# Patient Record
Sex: FEMALE | URBAN - METROPOLITAN AREA
[De-identification: names, ages, dates, MRNs, and addresses within clinical notes are randomized per-mention and may not be internally consistent; named-entity substitution may affect disease eponyms.]

---

## 2018-05-18 ENCOUNTER — OFFICE VISIT (OUTPATIENT)
Dept: ENDOCRINOLOGY | Facility: CLINIC | Age: 73
End: 2018-05-18
Payer: COMMERCIAL

## 2018-05-18 VITALS
OXYGEN SATURATION: 97 % | DIASTOLIC BLOOD PRESSURE: 78 MMHG | HEIGHT: 66 IN | BODY MASS INDEX: 37.46 KG/M2 | WEIGHT: 233.1 LBS | SYSTOLIC BLOOD PRESSURE: 147 MMHG | HEART RATE: 91 BPM

## 2018-05-18 DIAGNOSIS — E06.3 HASHIMOTO'S THYROIDITIS: ICD-10-CM

## 2018-05-18 DIAGNOSIS — K90.2 BLIND LOOP SYNDROME: Primary | ICD-10-CM

## 2018-05-18 DIAGNOSIS — R53.82 CHRONIC FATIGUE: ICD-10-CM

## 2018-05-18 DIAGNOSIS — E03.9 HYPOTHYROIDISM, UNSPECIFIED TYPE: ICD-10-CM

## 2018-05-18 LAB
ANION GAP SERPL CALCULATED.3IONS-SCNC: 7 MMOL/L (ref 3–14)
BUN SERPL-MCNC: 14 MG/DL (ref 7–30)
CALCIUM SERPL-MCNC: 9.4 MG/DL (ref 8.5–10.1)
CHLORIDE SERPL-SCNC: 109 MMOL/L (ref 94–109)
CO2 SERPL-SCNC: 26 MMOL/L (ref 20–32)
CREAT SERPL-MCNC: 0.65 MG/DL (ref 0.52–1.04)
DEPRECATED CALCIDIOL+CALCIFEROL SERPL-MC: 48 UG/L (ref 20–75)
GFR SERPL CREATININE-BSD FRML MDRD: 89 ML/MIN/1.7M2
GLUCOSE SERPL-MCNC: 118 MG/DL (ref 70–99)
POTASSIUM SERPL-SCNC: 4.2 MMOL/L (ref 3.4–5.3)
SODIUM SERPL-SCNC: 142 MMOL/L (ref 133–144)
T3 SERPL-MCNC: 145 NG/DL (ref 60–181)
T4 FREE SERPL-MCNC: 0.6 NG/DL (ref 0.76–1.46)
TSH SERPL DL<=0.005 MIU/L-ACNC: 0.31 MU/L (ref 0.4–4)
VIT B12 SERPL-MCNC: 709 PG/ML (ref 193–986)

## 2018-05-18 PROCEDURE — 84480 ASSAY TRIIODOTHYRONINE (T3): CPT | Performed by: INTERNAL MEDICINE

## 2018-05-18 PROCEDURE — 99000 SPECIMEN HANDLING OFFICE-LAB: CPT | Performed by: INTERNAL MEDICINE

## 2018-05-18 PROCEDURE — 82607 VITAMIN B-12: CPT | Performed by: INTERNAL MEDICINE

## 2018-05-18 PROCEDURE — 82306 VITAMIN D 25 HYDROXY: CPT | Performed by: INTERNAL MEDICINE

## 2018-05-18 PROCEDURE — 36415 COLL VENOUS BLD VENIPUNCTURE: CPT | Performed by: INTERNAL MEDICINE

## 2018-05-18 PROCEDURE — 99204 OFFICE O/P NEW MOD 45 MIN: CPT | Performed by: INTERNAL MEDICINE

## 2018-05-18 PROCEDURE — 84443 ASSAY THYROID STIM HORMONE: CPT | Performed by: INTERNAL MEDICINE

## 2018-05-18 PROCEDURE — 84439 ASSAY OF FREE THYROXINE: CPT | Performed by: INTERNAL MEDICINE

## 2018-05-18 PROCEDURE — 84207 ASSAY OF VITAMIN B-6: CPT | Mod: 90 | Performed by: INTERNAL MEDICINE

## 2018-05-18 PROCEDURE — 80048 BASIC METABOLIC PNL TOTAL CA: CPT | Performed by: INTERNAL MEDICINE

## 2018-05-18 RX ORDER — IRON 18 MG
29 TABLET ORAL
COMMUNITY
Start: 2016-02-09

## 2018-05-18 RX ORDER — CALCIUM POLYCARBOPHIL 625 MG
TABLET ORAL
COMMUNITY
Start: 2016-02-09

## 2018-05-18 RX ORDER — THYROID, PORCINE 60 MG/1
65 TABLET ORAL
COMMUNITY
Start: 2018-01-10

## 2018-05-18 RX ORDER — VALACYCLOVIR HYDROCHLORIDE 1 G/1
TABLET, FILM COATED ORAL
COMMUNITY
Start: 2015-04-02

## 2018-05-18 RX ORDER — MAGNESIUM HYDROXIDE 400 MG/5ML
SUSPENSION, ORAL (FINAL DOSE FORM) ORAL
COMMUNITY
Start: 2016-02-09

## 2018-05-18 RX ORDER — MULTIVITAMIN,THER AND MINERALS
TABLET ORAL
COMMUNITY
Start: 2016-02-09

## 2018-05-18 ASSESSMENT — PAIN SCALES - GENERAL: PAINLEVEL: NO PAIN (0)

## 2018-05-18 NOTE — PROGRESS NOTES
- Endocrinology Initial Consultation -    Reason for visit/consult:  hypothyroidism    Primary care provider: Ilan Celestin    HPI: A 72 yo female here for the second opinion for her hypothyroidism and treatment. She came with her  today.   Medical record from primary care physician and also from [facility] were reviewed prior to the session.   Her hypothyroidism was recently diagnosed in 2014 with a symptom of brain fogginess and chronic fatigue.  She was started on levothyroxine, she tried 3 years and last dose was levothyroxine 88 mcg and 100 mcg alternate days.   She still did not feel well, her TSH was 2.87,  free T4 0.91 in July 2017.   Her daughter has also hypothyroidism and she was started nature thyroid felt better.  Patient wanted to try nature thyroid and asked PMD.  She was started on 65 mcg and currently taking 130 mcg daily.  She was recommended to go to endocrinologist, she met through endocrinologist in [facility], however nature thyroid was not recommended, therefore she came today to look for an endocrinologist who can manage nature thyroid.  Since started to nature thyroid, her TSH has been lower side to suppressed, range from 0.4 to 0.05.  Her free T4 has been also suppressed range from 0.76 to 0.57.     Patient preferred nature thyroid, which worked for her brain fogginess and intolerance.  Her hair loss also getting better and her energy level getting better. Body weight stable.     Of note, patient has history of  IBS, as well as small bowel surgery twice. 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 She also has sleep apnea for 13 years on CPAP.     Energy level: better  Dry skin: not much  Hair loss: better  Weight changes: stable   BM:occasional   Concentration:   Forgetfulness:improved.   Family history of thyroid disease: daughter thyroid issues,  "nephew+      Past Medical/Surgical History:  Past Medical History:   Diagnosis Date     Sleep apnea      No past surgical history on file.    Allergies:  Allergies   Allergen Reactions     Nsaids      Other reaction(s): Other, see comments  PN: Ulcer     Celecoxib Rash       Current Medications   Current Outpatient Prescriptions   Medication     ascorbic acid 1000 MG TABS tablet     Calcium Polycarbophil (FIBER) 625 MG tablet     Cholecalciferol (VITAMIN D3) 1000 units CAPS     coenzyme Q-10 10 MG CAPS     Ferrous Sulfate (IRON) 90 (18 Fe) MG TABS     Glucosamine-Chondroit-Vit C-Mn (GLUCOSAMINE CHONDROITINOITIN COMPLEX) CAPS     ranitidine (ZANTAC) 150 MG tablet     Selenium 200 MCG TABS     Thyroid 65 MG TABS     valACYclovir (VALTREX) 1000 mg tablet     vitamin  s/Minerals TABS     No current facility-administered medications for this visit.        Family History:  No family history on file.    Social History:  Social History   Substance Use Topics     Smoking status: Not on file     Smokeless tobacco: Not on file     Alcohol use Not on file   Job: RN at [employer].     ROS:  Full review of systems taken with the help of the intake sheet. Otherwise a complete 14 point review of systems was taken and is negative unless stated in the history above.        Physical Exam:   Blood pressure 147/78, pulse 91, height 1.672 m (5' 5.83\"), weight 105.7 kg (233 lb 1.6 oz), SpO2 97 %.    General: well appearing, no acute distress, pleasant and conversant,   Mental Status/neuro: alert and oriented  Face: symmetrical, normal facial color  Eyes: anicteric, PERRL, no proptosis or lid lag  Neck: suppler, no lymphadenopahty  Thyroid: normal size and texture, no nodule palpable, no bruits  Heart: regular rhythm, S1S2, no murmur appreciated  Lung: clear to auscultation bilaterally  Abdomen: soft, NT/ND, no hepatomegaly  Legs: no swelling or edema        Labs : I reviewed data from outside records and extract and summarize the pertinent data " here.                    3/13/2018    1/10/2018   11/2017   9/2017   7/2017  TSH            0.445           0.05           0.1555     1.67      2.87  Free T4      0.57             0.8            0.76           0.62      0.91                                                                                         (prior to Nature thyroid)     (11/2017),  TSI 46 (0-139) (11/2017)    Assessment and Plan  73 year old female with hypothyrodism, history of small intestine surgery twice,    # Hypothyroidism  Both suppressed TSH and free T4 reflects dosing of T3 supplement, which derives from Nature thyroid. We went over the standard thryoid managemenet and importance of storage form of thyroid hormone as well.   Since she has history of small bowel resections twice, I think she may have some absorption issues, therefore recommend Tirosint such as 100 mcg. She wants to continue nature thyroid so far and she will think about my recommendation.   Meanwhile, we will measure TSH, free T4, total T3 today  - TSH, free T4, total T3 today    # Small bowel resections twice  She may have absorption issues, will check Vitmain D, Vitamin B6, and Vitamin B12, Ca  level today. Per patient recent CBC was OK.     # Bone health  Due to small bowel resections, recommend to check bone density and meanwhile start Calcium citrate plus D (elemental Ca 630 mg, Vitamin D 500 IU) for bone health and prevention.   - Bone density at PMD office to recommend    - RTC with me if necessary      I spent 45 minutes with this patient face to face and explained the conditions and plans (more than 50% of time was counseling/coordination of care) . The patient understood and is satisfied with today's visit. Return to clinic with me in PRN.         Melba Turpin MD  Staff Physician  Endocrinology and Metabolism  License: [id number]

## 2018-05-18 NOTE — LETTER
5/18/2018         RE: Grabiel Ballesteros  [address] [place] [address]        Dear Colleague,    Thank you for referring your patient, Grabiel Ballesteros, to the [facility]. Please see a copy of my visit note below.                                                                               - Endocrinology Initial Consultation -    Reason for visit/consult:  hypothyroidism    Primary care provider: Ilan Celestin    HPI: A 72 yo female here for the second opinion for her hypothyroidism and treatment. She came with her  today.   Medical record from primary care physician and also from [facility] were reviewed prior to the session.   Her hypothyroidism was recently diagnosed in 2014 with a symptom of brain fogginess and chronic fatigue.  She was started on levothyroxine, she tried 3 years and last dose was levothyroxine 88 mcg and 100 mcg alternate days.   She still did not feel well, her TSH was 2.87,  free T4 0.91 in July 2017.   Her daughter has also hypothyroidism and she was started nature thyroid felt better.  Patient wanted to try nature thyroid and asked PMD.  She was started on 65 mcg and currently taking 130 mcg daily.  She was recommended to go to endocrinologist, she met through endocrinologist in [facility], however nature thyroid was not recommended, therefore she came today to look for an endocrinologist who can manage nature thyroid.  Since started to nature thyroid, her TSH has been lower side to suppressed, range from 0.4 to 0.05.  Her free T4 has been also suppressed range from 0.76 to 0.57.     Patient preferred nature thyroid, which worked for her brain fogginess and intolerance.  Her hair loss also getting better and her energy level getting better. Body weight stable.     Of note, patient has history of  IBS, as well as small bowel surgery twice. 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 She also  "has sleep apnea for 13 years on CPAP.     Energy level: better  Dry skin: not much  Hair loss: better  Weight changes: stable   BM:occasional   Concentration:   Forgetfulness:improved.   Family history of thyroid disease: daughter thyroid issues, nephew+      Past Medical/Surgical History:  Past Medical History:   Diagnosis Date     Sleep apnea      No past surgical history on file.    Allergies:  Allergies   Allergen Reactions     Nsaids      Other reaction(s): Other, see comments  PN: Ulcer     Celecoxib Rash       Current Medications   Current Outpatient Prescriptions   Medication     ascorbic acid 1000 MG TABS tablet     Calcium Polycarbophil (FIBER) 625 MG tablet     Cholecalciferol (VITAMIN D3) 1000 units CAPS     coenzyme Q-10 10 MG CAPS     Ferrous Sulfate (IRON) 90 (18 Fe) MG TABS     Glucosamine-Chondroit-Vit C-Mn (GLUCOSAMINE CHONDROITINOITIN COMPLEX) CAPS     ranitidine (ZANTAC) 150 MG tablet     Selenium 200 MCG TABS     Thyroid 65 MG TABS     valACYclovir (VALTREX) 1000 mg tablet     vitamin  s/Minerals TABS     No current facility-administered medications for this visit.        Family History:  No family history on file.    Social History:  Social History   Substance Use Topics     Smoking status: Not on file     Smokeless tobacco: Not on file     Alcohol use Not on file   Job: RN at [employer].     ROS:  Full review of systems taken with the help of the intake sheet. Otherwise a complete 14 point review of systems was taken and is negative unless stated in the history above.        Physical Exam:   Blood pressure 147/78, pulse 91, height 1.672 m (5' 5.83\"), weight 105.7 kg (233 lb 1.6 oz), SpO2 97 %.    General: well appearing, no acute distress, pleasant and conversant,   Mental Status/neuro: alert and oriented  Face: symmetrical, normal facial color  Eyes: anicteric, PERRL, no proptosis or lid lag  Neck: suppler, no lymphadenopahty  Thyroid: normal size and texture, no nodule palpable, no bruits  Heart: " regular rhythm, S1S2, no murmur appreciated  Lung: clear to auscultation bilaterally  Abdomen: soft, NT/ND, no hepatomegaly  Legs: no swelling or edema        Labs : I reviewed data from outside records and extract and summarize the pertinent data here.                    3/13/2018    1/10/2018   11/2017   9/2017   7/2017  TSH            0.445           0.05           0.1555     1.67      2.87  Free T4      0.57             0.8            0.76           0.62      0.91                                                                                         (prior to Nature thyroid)     (11/2017),  TSI 46 (0-139) (11/2017)    Assessment and Plan  73 year old female with hypothyrodism, history of small intestine surgery twice,    # Hypothyroidism  Both suppressed TSH and free T4 reflects dosing of T3 supplement, which derives from Nature thyroid. We went over the standard thryoid managemenet and importance of storage form of thyroid hormone as well.   Since she has history of small bowel resections twice, I think she may have some absorption issues, therefore recommend Tirosint such as 100 mcg. She wants to continue nature thyroid so far and she will think about my recommendation.   Meanwhile, we will measure TSH, free T4, total T3 today  - TSH, free T4, total T3 today    # Small bowel resections twice  She may have absorption issues, will check Vitmain D, Vitamin B6, and Vitamin B12, Ca  level today. Per patient recent CBC was OK.     # Bone health  Due to small bowel resections, recommend to check bone density and meanwhile start Calcium citrate plus D (elemental Ca 630 mg, Vitamin D 500 IU) for bone health and prevention.   - Bone density at PMD office to recommend    - RTC with me if necessary      I spent 45 minutes with this patient face to face and explained the conditions and plans (more than 50% of time was counseling/coordination of care) . The patient understood and is satisfied with today's visit.  Return to clinic with me in PRN.         Melba Turpin MD  Staff Physician  Endocrinology and Metabolism  License: [id number]     Again, thank you for allowing me to participate in the care of your patient.        Sincerely,        Melba Turpin MD

## 2018-05-18 NOTE — LETTER
Patient:  Grabiel Ballesteros  :   1945  MRN:     [id number]        Ms.Ardys Ballesteros  [address] [place] [address]        May 18, 2018    Dear ,    We are writing to inform you of your test results.    TSH and free T4 low are again typical findings of Nature thyroid supplements.     Vitamin D and vitamin B12 are normal.     Regarding thyroid medication, same as we discussed during your visit.     Please take care    Melba Turpin MD      Resulted Orders   Vitamin D Deficiency   Result Value Ref Range    Vitamin D Deficiency screening 48 20 - 75 ug/L      Comment:      Season, race, dietary intake, and treatment affect the concentration of   25-hydroxy-Vitamin D. Values may decrease during winter months and increase   during summer months. Values 20-29 ug/L may indicate Vitamin D insufficiency   and values <20 ug/L may indicate Vitamin D deficiency.  Vitamin D determination is routinely performed by an immunoassay specific for   25 hydroxyvitamin D3.  If an individual is on vitamin D2 (ergocalciferol)   supplementation, please specify 25 OH vitamin D2 and D3 level determination by   LCMSMS test [id number].     Vitamin B12   Result Value Ref Range    Vitamin B12 709 193 - 986 pg/mL   Basic metabolic panel   Result Value Ref Range    Sodium 142 133 - 144 mmol/L    Potassium 4.2 3.4 - 5.3 mmol/L    Chloride 109 94 - 109 mmol/L    Carbon Dioxide 26 20 - 32 mmol/L    Anion Gap 7 3 - 14 mmol/L    Glucose 118 (H) 70 - 99 mg/dL      Comment:      Non Fasting    Urea Nitrogen 14 7 - 30 mg/dL    Creatinine 0.65 0.52 - 1.04 mg/dL    GFR Estimate 89 >60 mL/min/1.7m2      Comment:      Non  GFR Calc    GFR Estimate If Black >90 >60 mL/min/1.7m2      Comment:       GFR Calc    Calcium 9.4 8.5 - 10.1 mg/dL   TSH   Result Value Ref Range    TSH 0.31 (L) 0.40 - 4.00 mU/L   T4 free   Result Value Ref Range    T4 Free 0.60 (L) 0.76 - 1.46 ng/dL   T3 total   Result Value Ref  Range    Triiodothyronine (T3) 145 60 - 181 ng/dL       Melba Turpin MD

## 2018-05-18 NOTE — MR AVS SNAPSHOT
After Visit Summary   2018    Grabiel Ballesteros    MRN: [id number]           Patient Information     Date Of Birth          1945        Visit Information        Provider Department      2018 9:30 AM Melba Turpin MD [facility]        Care Instructions    Please allow 7-10 business days for your lab results. You will be notified by phone, letter, or My Chart after the provider has reviewed them.  Thank you.             Follow-ups after your visit        Follow-up notes from your care team     Return for Lab Work Today.      Who to contact     If you have questions or need follow up information about today's clinic visit or your schedule please contact [facility] directly at [phone].  Normal or non-critical lab and imaging results will be communicated to you by MyChart, letter or phone within 4 business days after the clinic has received the results. If you do not hear from us within 7 days, please contact the clinic through [employer] or phone. If you have a critical or abnormal lab result, we will notify you by phone as soon as possible.  Submit refill requests through [employer] or call your pharmacy and they will forward the refill request to us. Please allow 3 business days for your refill to be completed.          Additional Information About Your Visit        MyChart Information     [employer] is an electronic gateway that provides easy, online access to your medical records. With [employer] you can request a clinic appointment, read your test results, renew a prescription or communicate with your care team.     To sign up for [employer] visit the website at [email or web address]   You will be asked to enter the access code listed below, as well as some personal information. Please follow the directions to create your username and password.     Your access code is: [email or web address]  Expires: 2018 10:24 AM     Your access code will  in 90 days.  "If you need help or a new code, please contact your [facility] or call [phone] for assistance.        Care EveryWhere ID     This is your Care EveryWhere ID. This could be used by other organizations to access your [facility] medical records  [id number]        Your Vitals Were     Pulse Height Pulse Oximetry BMI (Body Mass Index)          91 1.672 m (5' 5.83\") 97% 37.82 kg/m2         Blood Pressure from Last 3 Encounters:   05/18/18 147/78    Weight from Last 3 Encounters:   05/18/18 105.7 kg (233 lb 1.6 oz)              Today, you had the following     No orders found for display       Primary Care Provider Office Phone # Fax #    Ilan Celestin [phone]       [facility] [address] [place] [address]        Equal Access to Services     [facility]: Hadii aad ku hadasho Soomaali, waaxda luqadaha, qaybta kaalmada adeegyada, waxay idiin hayaan bozena gannonaratorin hernandez . So [facility] [phone].    ATENCIÓN: Si habla español, tiene a chaparro disposición servicios gratuitos de asistencia lingüística. Brayden al [phone].    We comply with applicable federal civil rights laws and [place] laws. We do not discriminate on the basis of race, color, national origin, age, disability, sex, sexual orientation, or gender identity.            Thank you!     Thank you for choosing [facility]  for your care. Our goal is always to provide you with excellent care. Hearing back from our patients is one way we can continue to improve our services. Please take a few minutes to complete the written survey that you may receive in the mail after your visit with us. Thank you!             Your Updated Medication List - Protect others around you: Learn how to safely use, store and throw away your medicines at www.disposemymeds.org.          This list is accurate as of 5/18/18 10:24 AM.  Always use your most recent med list.                   Brand Name " Dispense Instructions for use Diagnosis    ascorbic acid 1000 MG Tabs tablet      Take 500 mg by mouth        coenzyme Q-10 10 MG Caps      Take 100 mg by mouth        Fiber 625 MG tablet           glucosamine chondroitinoitin complex Caps           Iron 90 (18 Fe) MG Tabs      Take 29 mg by mouth        ranitidine 150 MG tablet    ZANTAC     Take 150 mg by mouth        Selenium 200 MCG Tabs      Take 200 mcg by mouth        Thyroid 65 MG Tabs      Take 65 mg by mouth        valACYclovir 1000 mg tablet    VALTREX     Indications: PN:        vitamin  s/Minerals Tabs           vitamin D3 1000 units Caps

## 2018-05-18 NOTE — NURSING NOTE
"Grabiel Ballesteros's goals for this visit include:   Chief Complaint   Patient presents with     Consult     hypothyroidism       She requests these members of her care team be copied on today's visit information: yes     PCP: Ilan Celestin    Referring Provider:  Ilan Celestin  [facility]  [address] [place] [address]    /78  Pulse 91  Ht 1.672 m (5' 5.83\")  Wt 105.7 kg (233 lb 1.6 oz)  SpO2 97%  BMI 37.82 kg/m2    Do you need any medication refills at today's visit? No     Amorrae GERMAINE Hutton        "

## 2018-05-18 NOTE — LETTER
Patient:  Grabiel Ballesteros  :   1945  MRN:     [id number]        Ms.Ardys EULA Ballesteros  [address] [place] [address]        May 27, 2018    Dear ,    We are writing to inform you of your test results.  Abnormal thyroid results likely due to Nature thryoid.   Other vitamins were OK.    Pleaser take care    Melba Turpin MD    Resulted Orders   Vitamin D Deficiency   Result Value Ref Range    Vitamin D Deficiency screening 48 20 - 75 ug/L      Comment:      Season, race, dietary intake, and treatment affect the concentration of   25-hydroxy-Vitamin D. Values may decrease during winter months and increase   during summer months. Values 20-29 ug/L may indicate Vitamin D insufficiency   and values <20 ug/L may indicate Vitamin D deficiency.  Vitamin D determination is routinely performed by an immunoassay specific for   25 hydroxyvitamin D3.  If an individual is on vitamin D2 (ergocalciferol)   supplementation, please specify 25 OH vitamin D2 and D3 level determination by   LCMSMS test [id number].     Vitamin B12   Result Value Ref Range    Vitamin B12 709 193 - 986 pg/mL   Vitamin B6   Result Value Ref Range    Vitamin B6 95.3 20.0 - 125.0 nmol/L      Comment:      (Note)  INTERPRETIVE INFORMATION: Vitamin B6 (Pyridoxal 5-Phosphate)  Pyridoxal 5'-phosphate measured in a specimen collected   following an 8-hour or overnight fast accurately indicates   vitamin B6 nutritional status. Non-fasting specimen   concentration reflects recent vitamin intake.  Test developed and characteristics determined by [facility]. See Compliance Statement B: [employer]  Performed by [facility],  [address] [place] [id number] [phone]  [email or web address], Juan Fam MD, Lab. Director     Basic metabolic panel   Result Value Ref Range    Sodium 142 133 - 144 mmol/L    Potassium 4.2 3.4 - 5.3 mmol/L    Chloride 109 94 - 109 mmol/L    Carbon Dioxide 26 20 - 32 mmol/L    Anion Gap 7 3 - 14  mmol/L    Glucose 118 (H) 70 - 99 mg/dL      Comment:      Non Fasting    Urea Nitrogen 14 7 - 30 mg/dL    Creatinine 0.65 0.52 - 1.04 mg/dL    GFR Estimate 89 >60 mL/min/1.7m2      Comment:      Non  GFR Calc    GFR Estimate If Black >90 >60 mL/min/1.7m2      Comment:       GFR Calc    Calcium 9.4 8.5 - 10.1 mg/dL   TSH   Result Value Ref Range    TSH 0.31 (L) 0.40 - 4.00 mU/L   T4 free   Result Value Ref Range    T4 Free 0.60 (L) 0.76 - 1.46 ng/dL   T3 total   Result Value Ref Range    Triiodothyronine (T3) 145 60 - 181 ng/dL       Melba Turpin MD

## 2018-05-21 LAB — VIT B6 SERPL-MCNC: 95.3 NMOL/L (ref 20–125)
